# Patient Record
Sex: FEMALE | URBAN - METROPOLITAN AREA
[De-identification: names, ages, dates, MRNs, and addresses within clinical notes are randomized per-mention and may not be internally consistent; named-entity substitution may affect disease eponyms.]

---

## 2021-10-13 ENCOUNTER — OFFICE VISIT (OUTPATIENT)
Dept: URBAN - METROPOLITAN AREA CLINIC 56 | Facility: CLINIC | Age: 36
End: 2021-10-13
Payer: COMMERCIAL

## 2021-10-13 DIAGNOSIS — H11.041 PERIPHERAL PTERYGIUM, STATIONARY, RIGHT EYE: Primary | ICD-10-CM

## 2021-10-13 DIAGNOSIS — H52.13 MYOPIA, BILATERAL: ICD-10-CM

## 2021-10-13 PROCEDURE — 92004 COMPRE OPH EXAM NEW PT 1/>: CPT | Performed by: OPTOMETRIST

## 2021-10-13 ASSESSMENT — KERATOMETRY
OD: 46.83
OS: 46.85

## 2021-10-13 ASSESSMENT — INTRAOCULAR PRESSURE
OS: 14
OD: 14

## 2021-10-13 ASSESSMENT — VISUAL ACUITY
OD: 20/20
OS: 20/20

## 2021-10-13 NOTE — IMPRESSION/PLAN
Impression: Peripheral pterygium, stationary, right eye: H11.041. Plan: Discussed findings and diagnosis. Recommended frequent artificial tears for lubrication and UV protected sunglasses. Monitor.

## 2022-10-13 ENCOUNTER — OFFICE VISIT (OUTPATIENT)
Dept: URBAN - METROPOLITAN AREA CLINIC 56 | Facility: CLINIC | Age: 37
End: 2022-10-13
Payer: COMMERCIAL

## 2022-10-13 DIAGNOSIS — H52.13 MYOPIA, BILATERAL: ICD-10-CM

## 2022-10-13 DIAGNOSIS — H11.041 PERIPHERAL PTERYGIUM, STATIONARY, RIGHT EYE: Primary | ICD-10-CM

## 2022-10-13 PROCEDURE — 92014 COMPRE OPH EXAM EST PT 1/>: CPT | Performed by: STUDENT IN AN ORGANIZED HEALTH CARE EDUCATION/TRAINING PROGRAM

## 2022-10-13 ASSESSMENT — INTRAOCULAR PRESSURE
OD: 15
OS: 15

## 2022-10-13 ASSESSMENT — VISUAL ACUITY
OD: 20/20
OS: 20/20

## 2022-10-13 ASSESSMENT — KERATOMETRY
OS: 46.89
OD: 46.83

## 2022-10-13 NOTE — IMPRESSION/PLAN
Impression: Peripheral pterygium, stationary, right eye: H11.041. Plan: Pt complains of increased growth and irritation. D/c ClearEyes/Visine, ok to use Lumify PRN, start ATs and UV protected sunglasses. Monitor. 

Refer to Dr. Stoney Andrews for consult for excision

## 2022-11-10 ENCOUNTER — OFFICE VISIT (OUTPATIENT)
Dept: URBAN - METROPOLITAN AREA CLINIC 56 | Facility: CLINIC | Age: 37
End: 2022-11-10
Payer: COMMERCIAL

## 2022-11-10 DIAGNOSIS — H11.041 PERIPHERAL PTERYGIUM, STATIONARY, RIGHT EYE: Primary | ICD-10-CM

## 2022-11-10 DIAGNOSIS — H52.13 MYOPIA, BILATERAL: ICD-10-CM

## 2022-11-10 PROCEDURE — 99204 OFFICE O/P NEW MOD 45 MIN: CPT | Performed by: OPHTHALMOLOGY

## 2022-11-10 ASSESSMENT — VISUAL ACUITY
OD: 20/20
OS: 20/20

## 2022-11-10 ASSESSMENT — INTRAOCULAR PRESSURE
OS: 18
OD: 18

## 2023-11-25 NOTE — IMPRESSION/PLAN
Impression: Myopia, bilateral: H52.13. Plan: Reviewed with patient, discussed signs and symptoms of RD (floater, flashing lights, curtain or veil) Patient will contact the office immediately  if notes any new  vision changes.
Impression: Peripheral pterygium, stationary, right eye: H11.041. Plan: Discussed daily artificial tears to improve with dry eye, and informed can purchase eye drops over the counter. Sample of Artificial tears  provided to use three to four times daily OU as needed. 

RTC: 3-4 months for F/U
No